# Patient Record
(demographics unavailable — no encounter records)

---

## 2025-01-08 NOTE — HISTORY OF PRESENT ILLNESS
[FreeTextEntry1] :   SANDRA BAXTER Oct  7 1945   Language: Wolof Date of First visit: 01/08/2025 Accompanied by: son Aden and prefers son to translate Contact info: Referring Provider/PCP: Dr. Morse Fax: [phone]   CC/ Problem List: LUTs =============================================================================== FIRST VISIT / Summary: Very pleasant 79 year old M here for LUTs.  Reports significant LUTs. Most bothered by urgency and nocturia. Nocturia 5-7x. Drinks 1L water daily, 2-3 cups coffee and 2-3 cups tea, no spicy, acidic, citrus foods. Sometimes has oranges or coffee before bed. Currently taking alfuzosin. Was previously on finasteride and tamsulosin for 6 months but was advised to stop. Also previously on solifenacin and silodocin 8 mg from another urologist (called old pharmacy) and he states these medications had helped him a lot.  IPSS 32 QOL 6  testicular pain: painful pulsating sometimes right side or sometimes left. Applies warm compress and helpful. No pain with heavy lifting though does have inguinal hernias ------------------------------------------------------------------------------------------- INTERVAL VISITS:   ===============================================================================   PMH: DM, HTN  FHx: no  malignancies SocHx: current smoker 1ppd now 5-6 cigs >60 years, no Etoh,    PSH: hernia many years ago,   ROS: Review of Systems is as per HPI unless otherwise denoted below   =============================================================================== DATA: LABS (SELECTED):--------------------------------------------------------------------------------------------------- 11/4/2023 PSA 2.98 (possibly on finasteride)   RADS:-------------------------------------------------------------------------------------------------------------------     PATHOLOGY/CYTOLOGY:-------------------------------------------------------------------------------------------     VOIDING STUDIES: ----------------------------------------------------------------------------------------------------  01/08/2025 PVR 80 random scan    STONE STUDIES: (Analysis/LLSA)----------------------------------------------------------------------------------     PROCEDURES: -----------------------------------------------------------------------------------------------       =============================================================================== PHYSICAL EXAM:    FOCUSED: ---------------------------------------------------------------------------------------------------------------- 01/08/2025 niharika Casey NP Penis: No lesions, tenderness, curvature, plaques.  Meatus: normal caliber Testes: Descended bilaterally. Non tender, no palpable masses Epididymi: No palpable epididymal masses Scrotum: Scrotal wall normal. No spermatic cord mass. No palpable hydroceles  ======================================================================================= DISCUSSION: ======================================================================================= ASSESSMENT and PLAN   1. LUTs -restrict fluid intake 2-3 hours before bed -discussed behavioral modification and to avoid or limit (diet, caffeine, smoking) -silodocin and solifenacin, reviewed SE and sent to Rx on file. Stop Alfuzosin -f/u in 1 month with uroflow  2. Left inguinal hernia -referred to Dr. Limon / [place]   =======================================================================================  The total time personally spent preparing for this visit (reviewing test results, obtaining external history) and during the visit (ordering tests/medications, spent face to face with the patient / family and counseling them on the above), as well as after the visit (on clinical documentation and coordination with other care providers) was approximately 65 minutes in addition to any procedures.   Thank you for allowing me to assist in the care of your patient. Should you have any questions please do not hesitate to reach out to me.     Aleksander Weiner MD                                                    [facility] Physician [facility]   [place] Office: [address], [address] [place] [address] T: [phone] F: [phone]   [place] Office: [address] [place] [address], 1st [place] [address] T: [phone] F: [phone]

## 2025-01-08 NOTE — HISTORY OF PRESENT ILLNESS
[FreeTextEntry1] :   SANDRA BAXTER Oct  7 1945   Language: Indonesian Date of First visit: 01/08/2025 Accompanied by: son Aden and prefers son to translate Contact info: Referring Provider/PCP: Dr. Morse Fax: [phone]   CC/ Problem List: LUTs =============================================================================== FIRST VISIT / Summary: Very pleasant 79 year old M here for LUTs.  Reports significant LUTs. Most bothered by urgency and nocturia. Nocturia 5-7x. Drinks 1L water daily, 2-3 cups coffee and 2-3 cups tea, no spicy, acidic, citrus foods. Sometimes has oranges or coffee before bed. Currently taking alfuzosin. Was previously on finasteride and tamsulosin for 6 months but was advised to stop. Also previously on solifenacin and silodocin 8 mg from another urologist (called old pharmacy) and he states these medications had helped him a lot.  IPSS 32 QOL 6  testicular pain: painful pulsating sometimes right side or sometimes left. Applies warm compress and helpful. No pain with heavy lifting though does have inguinal hernias ------------------------------------------------------------------------------------------- INTERVAL VISITS:   ===============================================================================   PMH: DM, HTN  FHx: no  malignancies SocHx: current smoker 1ppd now 5-6 cigs >60 years, no Etoh,    PSH: hernia many years ago,   ROS: Review of Systems is as per HPI unless otherwise denoted below   =============================================================================== DATA: LABS (SELECTED):--------------------------------------------------------------------------------------------------- 11/4/2023 PSA 2.98 (possibly on finasteride)   RADS:-------------------------------------------------------------------------------------------------------------------     PATHOLOGY/CYTOLOGY:-------------------------------------------------------------------------------------------     VOIDING STUDIES: ----------------------------------------------------------------------------------------------------  01/08/2025 PVR 80 random scan    STONE STUDIES: (Analysis/LLSA)----------------------------------------------------------------------------------     PROCEDURES: -----------------------------------------------------------------------------------------------       =============================================================================== PHYSICAL EXAM:    FOCUSED: ---------------------------------------------------------------------------------------------------------------- 01/08/2025 niharika Casey NP Penis: No lesions, tenderness, curvature, plaques.  Meatus: normal caliber Testes: Descended bilaterally. Non tender, no palpable masses Epididymi: No palpable epididymal masses Scrotum: Scrotal wall normal. No spermatic cord mass. No palpable hydroceles  ======================================================================================= DISCUSSION: ======================================================================================= ASSESSMENT and PLAN   1. LUTs -restrict fluid intake 2-3 hours before bed -discussed behavioral modification and to avoid or limit (diet, caffeine, smoking) -silodocin and solifenacin, reviewed SE and sent to Rx on file. Stop Alfuzosin -f/u in 1 month with uroflow  2. Left inguinal hernia -referred to Dr. Limon / [place]   =======================================================================================  The total time personally spent preparing for this visit (reviewing test results, obtaining external history) and during the visit (ordering tests/medications, spent face to face with the patient / family and counseling them on the above), as well as after the visit (on clinical documentation and coordination with other care providers) was approximately 65 minutes in addition to any procedures.   Thank you for allowing me to assist in the care of your patient. Should you have any questions please do not hesitate to reach out to me.     Aleksander Weiner MD                                                    [facility] Physician [facility]   [place] Office: [address] [facility], [address] [place] [address] T: [phone] F: [phone]   [place] Office: [address] [place] [address], 1st [place] [address] T: [phone] F: [phone]

## 2025-02-04 NOTE — HISTORY OF PRESENT ILLNESS
[FreeTextEntry1] : SANDRA BAXTER Oct 7 1945  Language: Lithuanian Date of First visit: 01/08/2025 Accompanied by: son Aden and prefers son to translate Contact info: Referring Provider/PCP: Dr. Morse Fax: [phone]  CC/ Problem List: LUTs =============================================================================== FIRST VISIT / Summary: Very pleasant 79 year old M here for LUTs. Reports significant LUTs. Most bothered by urgency and nocturia. Nocturia 5-7x. Drinks 1L water daily, 2-3 cups coffee and 2-3 cups tea, no spicy, acidic, citrus foods. Sometimes has oranges or coffee before bed. Currently taking alfuzosin. Was previously on finasteride and tamsulosin for 6 months but was advised to stop. Also previously on solifenacin and silodocin 8 mg from another urologist (called old pharmacy) and he states these medications had helped him a lot. IPSS 32 QOL 6  testicular pain: painful pulsating sometimes right side or sometimes left. Applies warm compress and helpful. No pain with heavy lifting though does have inguinal hernias ------------------------------------------------------------------------------------------- INTERVAL VISITS: The patient's medications and allergies were reviewed and edited below. Dated 02/04/2025  ===============================================================================  PMH: DM, HTN  FHx: no  malignancies SocHx: current smoker 1ppd now 5-6 cigs >60 years, no Etoh,  PSH: hernia many years ago,  ROS: Review of Systems is as per HPI unless otherwise denoted below  =============================================================================== DATA: LABS (SELECTED):--------------------------------------------------------------------------------------------------- 11/4/2023 PSA 2.98 (possibly on finasteride)  RADS:-------------------------------------------------------------------------------------------------------------------   PATHOLOGY/CYTOLOGY:-------------------------------------------------------------------------------------------   VOIDING STUDIES: ---------------------------------------------------------------------------------------------------- 01/08/2025 PVR 80 random scan  STONE STUDIES: (Analysis/LLSA)----------------------------------------------------------------------------------   PROCEDURES: -----------------------------------------------------------------------------------------------    =============================================================================== PHYSICAL EXAM:   FOCUSED: ---------------------------------------------------------------------------------------------------------------- 01/08/2025 niharika Casey NP Penis: No lesions, tenderness, curvature, plaques. Meatus: normal caliber Testes: Descended bilaterally. Non tender, no palpable masses Epididymi: No palpable epididymal masses Scrotum: Scrotal wall normal. No spermatic cord mass. No palpable hydroceles  ======================================================================================= DISCUSSION: ======================================================================================= ASSESSMENT and PLAN  1. LUTs -restrict fluid intake 2-3 hours before bed -discussed behavioral modification and to avoid or limit (diet, caffeine, smoking) -silodocin and solifenacin, reviewed SE and sent to Rx on file. Stop Alfuzosin -f/u in 1 month with uroflow  2. Left inguinal hernia -referred to Dr. Limon / [place]  ======================================================================================= The total time personally spent preparing for this visit (reviewing test results, obtaining external history) and during the visit (ordering tests/medications, spent face to face with the patient / family and counseling them on the above), as well as after the visit (on clinical documentation and coordination with other care providers) was approximately 65 minutes in addition to any procedures.  Thank you for allowing me to assist in the care of your patient. Should you have any questions please do not hesitate to reach out to me.   Aleksander Weiner MD       [facility] Physician [facility]  [place] Office: [address] [facility], [address] [place] [address] T: [phone] F: [phone]  [place] Office: [address] [place] [address], 1st [place] [address] T: [phone] F: [phone]